# Patient Record
Sex: FEMALE | Race: WHITE
[De-identification: names, ages, dates, MRNs, and addresses within clinical notes are randomized per-mention and may not be internally consistent; named-entity substitution may affect disease eponyms.]

---

## 2017-05-31 ENCOUNTER — HOSPITAL ENCOUNTER (OUTPATIENT)
Dept: HOSPITAL 39 - GMAB | Age: 67
End: 2017-05-31
Attending: FAMILY MEDICINE
Payer: COMMERCIAL

## 2017-05-31 DIAGNOSIS — Z00.01: Primary | ICD-10-CM

## 2017-05-31 DIAGNOSIS — E83.52: Primary | ICD-10-CM

## 2017-12-08 ENCOUNTER — HOSPITAL ENCOUNTER (OUTPATIENT)
Dept: HOSPITAL 39 - CT | Age: 67
End: 2017-12-08
Attending: FAMILY MEDICINE
Payer: COMMERCIAL

## 2017-12-08 DIAGNOSIS — R10.32: ICD-10-CM

## 2017-12-08 DIAGNOSIS — N20.0: ICD-10-CM

## 2017-12-08 DIAGNOSIS — I72.8: ICD-10-CM

## 2017-12-08 DIAGNOSIS — I51.7: ICD-10-CM

## 2017-12-08 DIAGNOSIS — R10.33: Primary | ICD-10-CM

## 2017-12-08 DIAGNOSIS — K57.30: ICD-10-CM

## 2017-12-08 NOTE — CT
EXAM DESCRIPTION: 



Abdomen/Pelvis w/o Contrast



CLINICAL HISTORY: 



LLQ PAIN



COMPARISON: 



3/30/2015 



TECHNIQUE: 



CT of the abdomen and pelvis was performed without contrast.

Multiple axial images and multiplanar reconstructions were

generated. 



This exam was performed according to our departmental

dose-optimization program, which includes automated exposure

control, adjustment of the mA and/or kV according to patient size

and/or use of iterative reconstruction technique.



FINDINGS: 



Lung bases: The visualized lung bases are clear. The heart is

mildly enlarged. Coronary artery atherosclerosis.



Solid organs: Evaluation of the solid organs is limited due to

lack of IV contrast. Hepatic cyst again demonstrated. There are 2

nonobstructing right renal calculi measuring 2 to 3 mm each. The

spleen, pancreas, gallbladder, left kidney, and adrenal glands

are unremarkable on this noncontrast exam.



Gastrointestinal: The stomach and small intestine have an

unremarkable noncontrast appearance. Rare scattered colonic

diverticulosis without CT evidence for diverticulitis. What is

felt to represent a tiny appendix is unremarkable, and there is

no pericecal inflammation. No free fluid or free air.



Vascular: Again demonstrated is a peripherally calcified 1.67 m

splenic artery aneurysm.



Lymph nodes: No pathologically enlarged lymph nodes are present

by CT size criteria.



Musculoskeletal and soft tissues: No destructive osseous lesions

are present.



Urinary bladder and pelvic organs: The urinary bladder is normal.

The uterus is surgically absent.



IMPRESSION: 



1. No acute abdominal or pelvic noncontrast CT findings.

2. Nonobstructing right nephrolithiasis.

3. Rare colonic diverticulosis without CT evidence for

diverticulitis.

4. Unchanged size of the peripherally calcified 1.67 m splenic

artery aneurysm.

5. Cardiomegaly.



Electronically signed by:  Kiel Faria MD  12/8/2017 10:20 AM [facility]

Workstation: [id number]

## 2018-12-03 ENCOUNTER — HOSPITAL ENCOUNTER (OUTPATIENT)
Dept: HOSPITAL 39 - GMAE | Age: 68
End: 2018-12-03
Attending: FAMILY MEDICINE
Payer: MEDICARE

## 2018-12-03 DIAGNOSIS — I10: Primary | ICD-10-CM

## 2020-10-17 ENCOUNTER — HOSPITAL ENCOUNTER (OUTPATIENT)
Dept: HOSPITAL 39 - LAB.O | Age: 70
End: 2020-10-17
Attending: NURSE PRACTITIONER
Payer: MEDICARE

## 2020-10-17 DIAGNOSIS — U07.1: ICD-10-CM

## 2020-10-17 DIAGNOSIS — D64.9: ICD-10-CM

## 2020-10-17 DIAGNOSIS — R06.02: ICD-10-CM

## 2020-10-17 DIAGNOSIS — Z20.828: Primary | ICD-10-CM

## 2020-10-18 NOTE — RAD
EXAM DESCRIPTION: 

Chest,2 Views: CR/



CLINICAL HISTORY: 

70 years Female COVID positive



COMPARISON: 

2 view chest x-ray March 2016.



TECHNIQUE: 

Two views.  PA and Lateral.



FINDINGS: 

Lungs: Moderate expansion. Senescent markings. No consolidation.

No acute process. Pleural spaces: No effusion or pneumothorax

bilaterally.

Heart: Upper normal limits in size. Pulmonary Vascularity: Not

increased. Mediastinum: Not widened. Aorta: Unremarkable.

Bony Thorax/Spine: No acute bony thoracic abnormalities.

Bilateral joint arthrosis. Bone density may be slightly

decreased.



IMPRESSION: Senescent markings in the chest with no acute process

compared to the prior chest x-ray March 2016.  Imaging features

are atypical or uncommonly reported for COVID-19 pneumonia.

Alternative diagnoses should be considered.



Electronically signed by:  Cristian Boyd MD  10/18/2020 5:15 PM

[facility] Workstation: [id number]